# Patient Record
Sex: FEMALE | Race: BLACK OR AFRICAN AMERICAN | NOT HISPANIC OR LATINO
[De-identification: names, ages, dates, MRNs, and addresses within clinical notes are randomized per-mention and may not be internally consistent; named-entity substitution may affect disease eponyms.]

---

## 2017-11-10 ENCOUNTER — APPOINTMENT (OUTPATIENT)
Dept: OBGYN | Facility: CLINIC | Age: 42
End: 2017-11-10
Payer: MEDICAID

## 2017-11-10 VITALS
HEART RATE: 74 BPM | HEIGHT: 68 IN | SYSTOLIC BLOOD PRESSURE: 116 MMHG | DIASTOLIC BLOOD PRESSURE: 73 MMHG | WEIGHT: 146 LBS | BODY MASS INDEX: 22.13 KG/M2

## 2017-11-10 DIAGNOSIS — Z00.00 ENCOUNTER FOR GENERAL ADULT MEDICAL EXAMINATION W/OUT ABNORMAL FINDINGS: ICD-10-CM

## 2017-11-10 DIAGNOSIS — Z78.9 OTHER SPECIFIED HEALTH STATUS: ICD-10-CM

## 2017-11-10 PROCEDURE — 99386 PREV VISIT NEW AGE 40-64: CPT

## 2017-11-13 LAB
BASOPHILS # BLD AUTO: 0.03 K/UL
BASOPHILS NFR BLD AUTO: 0.5 %
C TRACH RRNA SPEC QL NAA+PROBE: NOT DETECTED
EOSINOPHIL # BLD AUTO: 0.14 K/UL
EOSINOPHIL NFR BLD AUTO: 2.3 %
FSH SERPL-MCNC: 8.1 IU/L
HBA1C MFR BLD HPLC: 5 %
HCT VFR BLD CALC: 38.3 %
HGB BLD-MCNC: 12.9 G/DL
HPV HIGH+LOW RISK DNA PNL CVX: NOT DETECTED
IMM GRANULOCYTES NFR BLD AUTO: 0.2 %
LH SERPL-ACNC: 6 IU/L
LYMPHOCYTES # BLD AUTO: 1.84 K/UL
LYMPHOCYTES NFR BLD AUTO: 29.8 %
MAN DIFF?: NORMAL
MCHC RBC-ENTMCNC: 31.8 PG
MCHC RBC-ENTMCNC: 33.7 GM/DL
MCV RBC AUTO: 94.3 FL
MONOCYTES # BLD AUTO: 0.48 K/UL
MONOCYTES NFR BLD AUTO: 7.8 %
N GONORRHOEA RRNA SPEC QL NAA+PROBE: NOT DETECTED
NEUTROPHILS # BLD AUTO: 3.67 K/UL
NEUTROPHILS NFR BLD AUTO: 59.4 %
PLATELET # BLD AUTO: 215 K/UL
PROLACTIN SERPL-MCNC: 13.2 NG/ML
RBC # BLD: 4.06 M/UL
RBC # FLD: 13.6 %
SOURCE TP AMPLIFICATION: NORMAL
T3FREE SERPL-MCNC: 2.82 PG/ML
T4 FREE SERPL-MCNC: 1.1 NG/DL
TESTOST SERPL-MCNC: 19.3 NG/DL
TSH SERPL-ACNC: 1.22 UIU/ML
WBC # FLD AUTO: 6.17 K/UL

## 2017-11-15 LAB — CYTOLOGY CVX/VAG DOC THIN PREP: NORMAL

## 2017-11-20 ENCOUNTER — APPOINTMENT (OUTPATIENT)
Dept: MAMMOGRAPHY | Facility: CLINIC | Age: 42
End: 2017-11-20
Payer: MEDICAID

## 2017-11-20 ENCOUNTER — OUTPATIENT (OUTPATIENT)
Dept: OUTPATIENT SERVICES | Facility: HOSPITAL | Age: 42
LOS: 1 days | End: 2017-11-20
Payer: MEDICAID

## 2017-11-20 DIAGNOSIS — Z00.8 ENCOUNTER FOR OTHER GENERAL EXAMINATION: ICD-10-CM

## 2017-11-20 PROCEDURE — 77063 BREAST TOMOSYNTHESIS BI: CPT

## 2017-11-20 PROCEDURE — 77063 BREAST TOMOSYNTHESIS BI: CPT | Mod: 26

## 2017-11-20 PROCEDURE — G0202: CPT | Mod: 26

## 2017-11-20 PROCEDURE — 77067 SCR MAMMO BI INCL CAD: CPT

## 2017-12-08 ENCOUNTER — APPOINTMENT (OUTPATIENT)
Dept: OBGYN | Facility: CLINIC | Age: 42
End: 2017-12-08
Payer: MEDICAID

## 2017-12-08 VITALS
WEIGHT: 146 LBS | SYSTOLIC BLOOD PRESSURE: 110 MMHG | HEIGHT: 68 IN | BODY MASS INDEX: 22.13 KG/M2 | HEART RATE: 63 BPM | DIASTOLIC BLOOD PRESSURE: 73 MMHG

## 2017-12-08 PROCEDURE — 99212 OFFICE O/P EST SF 10 MIN: CPT

## 2018-01-10 ENCOUNTER — APPOINTMENT (OUTPATIENT)
Dept: INTERVENTIONAL RADIOLOGY/VASCULAR | Facility: CLINIC | Age: 43
End: 2018-01-10
Payer: MEDICAID

## 2018-01-10 ENCOUNTER — OUTPATIENT (OUTPATIENT)
Dept: OUTPATIENT SERVICES | Facility: HOSPITAL | Age: 43
LOS: 1 days | End: 2018-01-10

## 2018-01-10 DIAGNOSIS — N97.9 FEMALE INFERTILITY, UNSPECIFIED: ICD-10-CM

## 2018-01-10 PROCEDURE — 58340 CATHETER FOR HYSTEROGRAPHY: CPT

## 2018-01-10 PROCEDURE — 74740 X-RAY FEMALE GENITAL TRACT: CPT | Mod: 26

## 2018-01-19 ENCOUNTER — APPOINTMENT (OUTPATIENT)
Dept: OBGYN | Facility: CLINIC | Age: 43
End: 2018-01-19
Payer: MEDICAID

## 2018-01-19 VITALS — BODY MASS INDEX: 21.68 KG/M2 | WEIGHT: 143.08 LBS | HEIGHT: 68 IN

## 2018-01-19 VITALS — HEART RATE: 88 BPM | SYSTOLIC BLOOD PRESSURE: 102 MMHG | DIASTOLIC BLOOD PRESSURE: 69 MMHG

## 2018-01-19 DIAGNOSIS — N97.9 FEMALE INFERTILITY, UNSPECIFIED: ICD-10-CM

## 2018-01-19 PROCEDURE — 99213 OFFICE O/P EST LOW 20 MIN: CPT

## 2018-03-14 ENCOUNTER — APPOINTMENT (OUTPATIENT)
Dept: OBGYN | Facility: CLINIC | Age: 43
End: 2018-03-14

## 2024-06-24 DIAGNOSIS — Z01.419 ENCOUNTER FOR GYNECOLOGICAL EXAMINATION (GENERAL) (ROUTINE) W/OUT ABNORMAL FINDINGS: ICD-10-CM

## 2024-07-19 ENCOUNTER — APPOINTMENT (OUTPATIENT)
Dept: FAMILY MEDICINE | Facility: CLINIC | Age: 49
End: 2024-07-19

## 2024-07-19 DIAGNOSIS — Z00.00 ENCOUNTER FOR GENERAL ADULT MEDICAL EXAMINATION W/OUT ABNORMAL FINDINGS: ICD-10-CM

## 2024-07-19 DIAGNOSIS — Z78.9 OTHER SPECIFIED HEALTH STATUS: ICD-10-CM

## 2024-07-30 ENCOUNTER — APPOINTMENT (OUTPATIENT)
Dept: OBGYN | Facility: CLINIC | Age: 49
End: 2024-07-30
Payer: MEDICAID

## 2024-07-30 ENCOUNTER — TRANSCRIPTION ENCOUNTER (OUTPATIENT)
Age: 49
End: 2024-07-30

## 2024-07-30 VITALS
SYSTOLIC BLOOD PRESSURE: 116 MMHG | HEIGHT: 68 IN | DIASTOLIC BLOOD PRESSURE: 74 MMHG | WEIGHT: 148.5 LBS | BODY MASS INDEX: 22.51 KG/M2

## 2024-07-30 DIAGNOSIS — Z78.9 OTHER SPECIFIED HEALTH STATUS: ICD-10-CM

## 2024-07-30 DIAGNOSIS — Z12.39 ENCOUNTER FOR OTHER SCREENING FOR MALIGNANT NEOPLASM OF BREAST: ICD-10-CM

## 2024-07-30 DIAGNOSIS — Z01.419 ENCOUNTER FOR GYNECOLOGICAL EXAMINATION (GENERAL) (ROUTINE) W/OUT ABNORMAL FINDINGS: ICD-10-CM

## 2024-07-30 DIAGNOSIS — Z83.3 FAMILY HISTORY OF DIABETES MELLITUS: ICD-10-CM

## 2024-07-30 DIAGNOSIS — Z86.018 PERSONAL HISTORY OF OTHER BENIGN NEOPLASM: ICD-10-CM

## 2024-07-30 DIAGNOSIS — Z87.42 PERSONAL HISTORY OF OTHER DISEASES OF THE FEMALE GENITAL TRACT: ICD-10-CM

## 2024-07-30 PROCEDURE — 99459 PELVIC EXAMINATION: CPT

## 2024-07-30 PROCEDURE — 99386 PREV VISIT NEW AGE 40-64: CPT

## 2024-07-30 RX ORDER — UBIDECARENONE 200 MG
CAPSULE ORAL
Refills: 0 | Status: ACTIVE | COMMUNITY

## 2024-07-30 RX ORDER — FOLIC ACID 1 MG/1
1 TABLET ORAL
Refills: 0 | Status: ACTIVE | COMMUNITY

## 2024-07-30 NOTE — PHYSICAL EXAM
[Chaperone Present] : A chaperone was present in the examining room during all aspects of the physical examination [24638] : A chaperone was present during the pelvic exam. [Appropriately responsive] : appropriately responsive [Alert] : alert [No Acute Distress] : no acute distress [Soft] : soft [Non-tender] : non-tender [Non-distended] : non-distended [No HSM] : No HSM [No Lesions] : no lesions [No Mass] : no mass [Oriented x3] : oriented x3 [Examination Of The Breasts] : a normal appearance [No Masses] : no breast masses were palpable [Labia Majora] : normal [Labia Minora] : normal [Normal] : normal [Uterine Adnexae] : normal [No Tenderness] : no tenderness [Nl Sphincter Tone] : normal sphincter tone

## 2024-07-30 NOTE — HISTORY OF PRESENT ILLNESS
[N] : Patient does not use contraception [Y] : Positive pregnancy history [Regular Cycle Intervals] : periods have been regular [Frequency: Q ___ days] : menstrual periods occur approximately every [unfilled] days [Menarche Age: ____] : age at menarche was [unfilled] [PGHxTotal] : 2 [[facility] : [facility] [PGHxPremature] : 0 [PGHxAbortions] : 1 [[facility]] : 1 [PGHxABInduced] : 0 [PGHxEctopic] : 0 [PGHxABSpont] : 1 [PGHxMultBirths] : 0

## 2024-07-31 LAB — HPV HIGH+LOW RISK DNA PNL CVX: NOT DETECTED

## 2024-08-05 LAB — CYTOLOGY CVX/VAG DOC THIN PREP: NORMAL

## 2024-08-29 ENCOUNTER — LABORATORY RESULT (OUTPATIENT)
Age: 49
End: 2024-08-29

## 2024-08-29 ENCOUNTER — APPOINTMENT (OUTPATIENT)
Dept: INTERNAL MEDICINE | Facility: CLINIC | Age: 49
End: 2024-08-29
Payer: MEDICAID

## 2024-08-29 VITALS
TEMPERATURE: 97.6 F | OXYGEN SATURATION: 97 % | RESPIRATION RATE: 16 BRPM | SYSTOLIC BLOOD PRESSURE: 107 MMHG | BODY MASS INDEX: 22.91 KG/M2 | DIASTOLIC BLOOD PRESSURE: 71 MMHG | HEIGHT: 67 IN | HEART RATE: 78 BPM | WEIGHT: 146 LBS

## 2024-08-29 DIAGNOSIS — Z00.00 ENCOUNTER FOR GENERAL ADULT MEDICAL EXAMINATION W/OUT ABNORMAL FINDINGS: ICD-10-CM

## 2024-08-29 PROCEDURE — 99386 PREV VISIT NEW AGE 40-64: CPT | Mod: 25

## 2024-08-29 PROCEDURE — G0444 DEPRESSION SCREEN ANNUAL: CPT | Mod: 59

## 2024-08-29 NOTE — HISTORY OF PRESENT ILLNESS
[FreeTextEntry1] : Physical [de-identified] : here to establish care  c/o left side body pain / tingling at times - started after a patient fell on her in 2018  no recent falls or trauma  denies any HA or blurry vision

## 2024-08-29 NOTE — PLAN
[FreeTextEntry1] :   Annual Physical:  Counseled on dietary modification, daily regular exercise.  Increase fiber/ plant-based diet and decrease intake of meats and animal-based diet.  To routinely use seat belts. Self-breast and skin exams advised.  Immunizations discussed and counseled. Age-appropriate screening exams discussed.  Colonoscopy: ref ordered   ? etio to symptoms - advised to consult neuro - might need further w/u incl MRI spine

## 2024-08-29 NOTE — HEALTH RISK ASSESSMENT
[Very Good] : ~his/her~  mood as very good [No] : No [No falls in past year] : Patient reported no falls in the past year [0] : 2) Feeling down, depressed, or hopeless: Not at all (0) [PHQ-2 Negative - No further assessment needed] : PHQ-2 Negative - No further assessment needed [Never] : Never [NO] : No [HIV Test offered] : HIV Test offered [Hepatitis C test offered] : Hepatitis C test offered [None] : None [Alone] : lives alone [] :  [Sexually Active] : sexually active [Feels Safe at Home] : Feels safe at home [Fully functional (bathing, dressing, toileting, transferring, walking, feeding)] : Fully functional (bathing, dressing, toileting, transferring, walking, feeding) [Fully functional (using the telephone, shopping, preparing meals, housekeeping, doing laundry, using] : Fully functional and needs no help or supervision to perform IADLs (using the telephone, shopping, preparing meals, housekeeping, doing laundry, using transportation, managing medications and managing finances) [Smoke Detector] : smoke detector [Carbon Monoxide Detector] : carbon monoxide detector [Seat Belt] :  uses seat belt [Sunscreen] : uses sunscreen [de-identified] : moderate [de-identified] : good [Change in mental status noted] : No change in mental status noted [High Risk Behavior] : no high risk behavior [Reports changes in hearing] : Reports no changes in hearing [Reports changes in vision] : Reports no changes in vision [Reports normal functional visual acuity (ie: able to read med bottle)] : Reports poor functional visual acuity.  [Travel to Developing Areas] : does not  travel to developing areas [MammogramDate] : 08/2024 [PapSmearDate] : 08/2024 [ColonoscopyComments] : never

## 2024-09-04 ENCOUNTER — APPOINTMENT (OUTPATIENT)
Dept: INTERNAL MEDICINE | Facility: CLINIC | Age: 49
End: 2024-09-04

## 2024-09-05 LAB
ALBUMIN SERPL ELPH-MCNC: 4.3 G/DL
ALP BLD-CCNC: 71 U/L
ALT SERPL-CCNC: 14 U/L
ANION GAP SERPL CALC-SCNC: 12 MMOL/L
APPEARANCE: CLEAR
AST SERPL-CCNC: 19 U/L
BASOPHILS # BLD AUTO: 0.05 K/UL
BASOPHILS NFR BLD AUTO: 0.9 %
BILIRUB SERPL-MCNC: 0.4 MG/DL
BILIRUBIN URINE: NEGATIVE
BLOOD URINE: ABNORMAL
BUN SERPL-MCNC: 8 MG/DL
CALCIUM SERPL-MCNC: 10 MG/DL
CHLORIDE SERPL-SCNC: 105 MMOL/L
CHOLEST SERPL-MCNC: 200 MG/DL
CO2 SERPL-SCNC: 24 MMOL/L
COLOR: YELLOW
CREAT SERPL-MCNC: 0.86 MG/DL
EGFR: 84 ML/MIN/1.73M2
EOSINOPHIL # BLD AUTO: 0.04 K/UL
EOSINOPHIL NFR BLD AUTO: 0.7 %
ESTIMATED AVERAGE GLUCOSE: 103 MG/DL
GLUCOSE QUALITATIVE U: NEGATIVE MG/DL
GLUCOSE SERPL-MCNC: 78 MG/DL
HBA1C MFR BLD HPLC: 5.2 %
HCT VFR BLD CALC: 37.8 %
HDLC SERPL-MCNC: 56 MG/DL
HGB BLD-MCNC: 12.5 G/DL
IMM GRANULOCYTES NFR BLD AUTO: 0.2 %
KETONES URINE: 15 MG/DL
LDLC SERPL CALC-MCNC: 135 MG/DL
LEUKOCYTE ESTERASE URINE: NEGATIVE
LYMPHOCYTES # BLD AUTO: 1.54 K/UL
LYMPHOCYTES NFR BLD AUTO: 27 %
MAN DIFF?: NORMAL
MCHC RBC-ENTMCNC: 31.3 PG
MCHC RBC-ENTMCNC: 33.1 GM/DL
MCV RBC AUTO: 94.7 FL
MONOCYTES # BLD AUTO: 0.47 K/UL
MONOCYTES NFR BLD AUTO: 8.2 %
NEUTROPHILS # BLD AUTO: 3.6 K/UL
NEUTROPHILS NFR BLD AUTO: 63 %
NITRITE URINE: NEGATIVE
NONHDLC SERPL-MCNC: 143 MG/DL
PH URINE: 5
PLATELET # BLD AUTO: 239 K/UL
POTASSIUM SERPL-SCNC: 4.3 MMOL/L
PROT SERPL-MCNC: 7 G/DL
PROTEIN URINE: 30 MG/DL
RBC # BLD: 3.99 M/UL
RBC # FLD: 13.2 %
SODIUM SERPL-SCNC: 141 MMOL/L
SPECIFIC GRAVITY URINE: >1.03
TRIGL SERPL-MCNC: 43 MG/DL
TSH SERPL-ACNC: 0.87 UIU/ML
URATE SERPL-MCNC: 4.3 MG/DL
UROBILINOGEN URINE: 1 MG/DL
WBC # FLD AUTO: 5.71 K/UL

## 2024-09-06 LAB
HAV IGM SER QL: NONREACTIVE
HBV CORE IGM SER QL: NONREACTIVE
HBV SURFACE AG SER QL: NONREACTIVE
HCV AB SER QL: NONREACTIVE
HCV S/CO RATIO: 0.11 S/CO

## 2024-09-12 ENCOUNTER — NON-APPOINTMENT (OUTPATIENT)
Age: 49
End: 2024-09-12

## 2024-09-26 ENCOUNTER — APPOINTMENT (OUTPATIENT)
Dept: INTERNAL MEDICINE | Facility: CLINIC | Age: 49
End: 2024-09-26

## 2024-09-26 PROCEDURE — ZZZZZ: CPT

## 2024-09-30 LAB
APPEARANCE: CLEAR
BILIRUBIN URINE: NEGATIVE
BLOOD URINE: NEGATIVE
COLOR: YELLOW
GLUCOSE QUALITATIVE U: NEGATIVE MG/DL
KETONES URINE: NEGATIVE MG/DL
LEUKOCYTE ESTERASE URINE: NEGATIVE
NITRITE URINE: NEGATIVE
PH URINE: 5.5
PROTEIN URINE: NEGATIVE MG/DL
SPECIFIC GRAVITY URINE: 1.01
UROBILINOGEN URINE: 0.2 MG/DL

## 2024-11-22 ENCOUNTER — APPOINTMENT (OUTPATIENT)
Dept: GASTROENTEROLOGY | Facility: CLINIC | Age: 49
End: 2024-11-22

## 2024-11-22 ENCOUNTER — APPOINTMENT (OUTPATIENT)
Dept: GASTROENTEROLOGY | Facility: CLINIC | Age: 49
End: 2024-11-22
Payer: MEDICAID

## 2024-11-22 VITALS
WEIGHT: 155 LBS | DIASTOLIC BLOOD PRESSURE: 85 MMHG | HEIGHT: 67 IN | TEMPERATURE: 97.6 F | SYSTOLIC BLOOD PRESSURE: 130 MMHG | HEART RATE: 76 BPM | BODY MASS INDEX: 24.33 KG/M2 | OXYGEN SATURATION: 98 %

## 2024-11-22 DIAGNOSIS — K59.01 SLOW TRANSIT CONSTIPATION: ICD-10-CM

## 2024-11-22 DIAGNOSIS — Z12.11 ENCOUNTER FOR SCREENING FOR MALIGNANT NEOPLASM OF COLON: ICD-10-CM

## 2024-11-22 DIAGNOSIS — K59.09 OTHER CONSTIPATION: ICD-10-CM

## 2024-11-22 PROCEDURE — 99204 OFFICE O/P NEW MOD 45 MIN: CPT

## 2024-11-22 RX ORDER — POLYETHYLENE GLYCOL 3350 17 G/17G
17 POWDER, FOR SOLUTION ORAL
Qty: 238 | Refills: 0 | Status: ACTIVE | COMMUNITY
Start: 2024-11-22 | End: 1900-01-01

## 2025-01-17 ENCOUNTER — TRANSCRIPTION ENCOUNTER (OUTPATIENT)
Age: 50
End: 2025-01-17

## 2025-01-17 ENCOUNTER — OUTPATIENT (OUTPATIENT)
Dept: OUTPATIENT SERVICES | Facility: HOSPITAL | Age: 50
LOS: 1 days | End: 2025-01-17
Payer: COMMERCIAL

## 2025-01-17 ENCOUNTER — APPOINTMENT (OUTPATIENT)
Dept: GASTROENTEROLOGY | Facility: GI CENTER | Age: 50
End: 2025-01-17
Payer: MEDICAID

## 2025-01-17 DIAGNOSIS — Z12.11 ENCOUNTER FOR SCREENING FOR MALIGNANT NEOPLASM OF COLON: ICD-10-CM

## 2025-01-17 PROCEDURE — G0121: CPT | Mod: 74

## 2025-01-17 PROCEDURE — 45378 DIAGNOSTIC COLONOSCOPY: CPT | Mod: 53,33

## 2025-03-20 ENCOUNTER — EMERGENCY (EMERGENCY)
Facility: HOSPITAL | Age: 50
LOS: 1 days | Discharge: HOME | End: 2025-03-20
Attending: EMERGENCY MEDICINE
Payer: COMMERCIAL

## 2025-03-20 VITALS
TEMPERATURE: 98 F | RESPIRATION RATE: 18 BRPM | DIASTOLIC BLOOD PRESSURE: 69 MMHG | HEART RATE: 95 BPM | OXYGEN SATURATION: 98 % | SYSTOLIC BLOOD PRESSURE: 124 MMHG

## 2025-03-20 PROCEDURE — 99284 EMERGENCY DEPT VISIT MOD MDM: CPT | Mod: 25

## 2025-03-20 PROCEDURE — 99283 EMERGENCY DEPT VISIT LOW MDM: CPT

## 2025-03-20 RX ORDER — DIPHENHYDRAMINE HCL 12.5MG/5ML
1 ELIXIR ORAL
Qty: 18 | Refills: 0
Start: 2025-03-20 | End: 2025-03-22

## 2025-03-20 RX ORDER — PREDNISONE 20 MG/1
1 TABLET ORAL
Qty: 1 | Refills: 0
Start: 2025-03-20 | End: 2025-03-20

## 2025-03-20 RX ORDER — DIPHENHYDRAMINE HCL 12.5MG/5ML
50 ELIXIR ORAL ONCE
Refills: 0 | Status: COMPLETED | OUTPATIENT
Start: 2025-03-20 | End: 2025-03-20

## 2025-03-20 RX ORDER — PREDNISONE 20 MG/1
40 TABLET ORAL ONCE
Refills: 0 | Status: ACTIVE | OUTPATIENT
Start: 2025-03-20 | End: 2025-03-20

## 2025-03-20 RX ADMIN — Medication 20 MILLIGRAM(S): at 05:35

## 2025-03-20 RX ADMIN — Medication 50 MILLIGRAM(S): at 05:35

## 2025-03-20 NOTE — ED PROVIDER NOTE - NSFOLLOWUPCLINICS_GEN_ALL_ED_FT
[facility]  Allergy  [address] [place] [address]  Phone: [phone]  Fax:

## 2025-03-20 NOTE — ED PROVIDER NOTE - WORK/EXCUSE FORM DATE
24-Mar-2025
PAST SURGICAL HISTORY:  History of left oophorectomy     S/P laparoscopic surgery laparoscopy with left ovarian mass biopsies    Urethral disorder urethral dilation  at 24 yr old for cystitis  chronic  UTI "S   no problem since then

## 2025-03-20 NOTE — ED PROVIDER NOTE - PATIENT PORTAL LINK FT
You can access the FollowMyHealth Patient Portal offered by [facility] by registering at the following website: [email or web address]. By joining [employer]’s FollowMyHealth portal, you will also be able to view your health information using other applications (apps) compatible with our system.

## 2025-03-20 NOTE — ED PROVIDER NOTE - OBJECTIVE STATEMENT
50yo female with no pmhx presents to ED c/o pruritic rash x 3 days. Pt states her rash began monday on her arms and since then has spread to her whole body. Pt states she was taking clindamycin for an infected cut on her R shin and finished the course on tuesday. Pt states she has not used any other new medications, denies new detergents, fragrances. Pt denies any difficulty breathing, difficulty swallowing, throat swelling, fever. Pt speaking in full sentences, tolerating secretions. Pt denies taking any meds at home for ssx. Pt denies any other complaints at this time.

## 2025-03-20 NOTE — ED PROVIDER NOTE - PHYSICAL EXAMINATION
Gen: No acute distress, non toxic  HENT: NCAT, Mucous membranes moist, Oropharynx without exudates, uvula midline  Eyes: pink conjunctivae, EOMI, PERRL  CV: RRR, nl s1/s2.  Resp: CTAB, normal rate and effort  GI: Abdomen soft, NT, ND. No rebound, no guarding  Neuro: A&O x 3  MSK: No spine or joint tenderness to palpation, Full ROM ext x 4  Skin: +pruritic hives to bilateral arms, trunk

## 2025-03-20 NOTE — ED ADULT TRIAGE NOTE - CHIEF COMPLAINT QUOTE
pt c/o itchy all over with hives started on monday at 2000 pt states she recently stopped taking clindamycin on monday

## 2025-03-20 NOTE — ED PROVIDER NOTE - CLINICAL SUMMARY MEDICAL DECISION MAKING FREE TEXT BOX
48yo female with no pmhx presents to ED c/o pruritic rash x 3 days. Pt states her rash began monday on her arms and since then has spread to her whole body. Pt states she was taking clindamycin for an infected cut on her R shin and finished the course on tuesday. Pt states she has not used any other new medications, denies new detergents, fragrances. Pt denies any difficulty breathing, difficulty swallowing, throat swelling, fever. Pt speaking in full sentences, tolerating secretions. Pt denies taking any meds at home for ssx. Pt denies any other complaints at this time. Rash consistent with allergic reaction. Pt improved with meds in ED, sending meds to pharmacy and giving allergist follow up. Pt to be discharged. Pt agrees and understands with plan for discharge. Return precautions discussed with patient. 48yo female with no pmhx presents to ED c/o pruritic rash x 3 days. Pt states her rash began monday on her arms and since then has spread to her whole body. Pt states she was taking clindamycin for an infected cut on her R shin and finished the course on tuesday. Pt states she has not used any other new medications, denies new detergents, fragrances. Pt denies any difficulty breathing, difficulty swallowing, throat swelling, fever. Pt speaking in full sentences, tolerating secretions. Pt denies taking any meds at home for ssx. Pt denies any other complaints at this time. Rash consistent with allergic reaction. Pt improved with meds in ED, no longer itching. sending meds to pharmacy and giving allergist follow up. Pt to be discharged. Pt agrees and understands with plan for discharge. Return precautions discussed with patient. 50yo female with no pmhx presents to ED c/o pruritic rash x 3 days. Pt states her rash began monday on her arms and since then has spread to her whole body. Pt states she was taking clindamycin for an infected cut on her R shin and finished the course on tuesday. Pt states she has not used any other new medications, denies new detergents, fragrances. Pt denies any difficulty breathing, difficulty swallowing, throat swelling, fever. Pt speaking in full sentences, tolerating secretions. Pt denies taking any meds at home for ssx. Pt denies any other complaints at this time. Rash consistent with urticaria. Pt improved with meds in ED, no longer itching. sending meds to pharmacy and giving allergist follow up. Pt to be discharged. Pt agrees and understands with plan for discharge. Return precautions discussed with patient.

## 2025-03-20 NOTE — ED PROVIDER NOTE - ATTENDING APP SHARED VISIT CONTRIBUTION OF CARE
48yo female with no pmhx presents to ED c/o pruritic rash x 3 days. Pt states her rash began monday on her arms and since then has spread to her whole body. Pt states she was taking clindamycin for an infected cut on her R shin and finished the course on tuesday. Pt states she has not used any other new medications, denies new detergents, fragrances. Pt denies any difficulty breathing, difficulty swallowing, throat swelling, fever. Pt speaking in full sentences, tolerating secretions. Pt denies taking any meds at home for ssx. Pt denies any other complaints at this time. Rash consistent with urticaria. Pt improved with meds in ED, no longer itching. sending meds to pharmacy and giving allergist follow up. Pt to be discharged. Pt agrees and understands with plan for discharge. Return precautions discussed with patient.

## 2025-03-20 NOTE — ED PROVIDER NOTE - NS ED ROS FT
Pt's mother Yumiko Montgomery was contacted about dexa scan. Yumiko Montgomery states no one contacted pt or herself. [facility]) Scheduling number given to Yumiko Montgomery.   Yumiko Montgomery states she will schedule dexa scan and contact our office with date and time of test.
Gen: denies fever, chills, fatigue, weight loss  Skin: +rash  HEENT: denies visual changes, ear pain, nasal congestion, throat pain  Respiratory: denies STEEN, SOB, cough, wheezing  Cardiovascular: denies chest pain, palpitations, diaphoresis, LE edema  GI: denies abdominal pain, n/v/d  : denies dysuria, frequency, urgency, bowel/bladder incontinence  MSK: denies joint swelling/pain, back pain, neck pain  Neuro: denies headache, dizziness, weakness, numbness

## 2025-03-20 NOTE — ED PROVIDER NOTE - NSFOLLOWUPINSTRUCTIONS_ED_ALL_ED_FT
Please follow up with allergist within 3 days.    Please follow up with PCP wtihin 3 days.    Allergic Reaction    An allergic reaction is an abnormal reaction to a substance (allergen) by the body's defense system. Common allergens include medicines, food, insect bites or stings, and blood products. The body releases certain proteins into the blood that can cause a variety of symptoms such as an itchy rash, wheezing, swelling of the face/lips/tongue/throat, abdominal pain, nausea or vomiting. An allergic reaction is usually treated with medication. If your health care provider prescribed you an epinephrine injection device, make sure to keep it with you at all times.    SEEK IMMEDIATE MEDICAL CARE IF YOU HAVE ANY OF THE FOLLOWING SYMPTOMS: allergic reaction severe enough that required you to use epinephrine, tightness in your chest, swelling around your lips/tongue/throat, abdominal pain, vomiting or diarrhea, or lightheadedness/dizziness. These symptoms may represent a serious problem that is an emergency. Do not wait to see if the symptoms will go away. Use your auto-injector pen or anaphylaxis kit as you have been instructed. Call 911 and do not drive yourself to the hospital.

## 2025-05-08 ENCOUNTER — OFFICE (OUTPATIENT)
Dept: URBAN - METROPOLITAN AREA CLINIC 115 | Facility: CLINIC | Age: 50
Setting detail: OPHTHALMOLOGY
End: 2025-05-08
Payer: COMMERCIAL

## 2025-05-08 DIAGNOSIS — H40.013: ICD-10-CM

## 2025-05-08 PROBLEM — H52.7 REFRACTIVE ERROR: Status: ACTIVE | Noted: 2025-05-08

## 2025-05-08 PROCEDURE — 92002 INTRM OPH EXAM NEW PATIENT: CPT | Performed by: OPTOMETRIST

## 2025-05-08 PROCEDURE — 92250 FUNDUS PHOTOGRAPHY W/I&R: CPT | Performed by: OPTOMETRIST

## 2025-05-08 ASSESSMENT — REFRACTION_AUTOREFRACTION
OS_SPHERE: +0.50
OS_AXIS: 074
OD_AXIS: 048
OD_CYLINDER: -0.50
OS_CYLINDER: -0.25
OD_SPHERE: +0.25

## 2025-05-08 ASSESSMENT — REFRACTION_CURRENTRX
OD_OVR_VA: 20/
OS_SPHERE: +1.75
OD_AXIS: 180
OD_CYLINDER: 0.00
OS_CYLINDER: 0.00
OD_SPHERE: +1.50
OD_VPRISM_DIRECTION: SV
OS_OVR_VA: 20/
OS_AXIS: 180
OS_VPRISM_DIRECTION: SV

## 2025-05-08 ASSESSMENT — CONFRONTATIONAL VISUAL FIELD TEST (CVF)
OS_FINDINGS: FULL
OD_FINDINGS: FULL

## 2025-05-08 ASSESSMENT — VISUAL ACUITY
OS_BCVA: 20/20-1
OD_BCVA: 20/20-1

## 2025-05-08 ASSESSMENT — TONOMETRY: OS_IOP_MMHG: 21

## 2025-08-29 ENCOUNTER — LABORATORY RESULT (OUTPATIENT)
Age: 50
End: 2025-08-29

## 2025-08-29 ENCOUNTER — NON-APPOINTMENT (OUTPATIENT)
Age: 50
End: 2025-08-29

## 2025-08-29 ENCOUNTER — APPOINTMENT (OUTPATIENT)
Dept: INTERNAL MEDICINE | Facility: CLINIC | Age: 50
End: 2025-08-29

## 2025-08-29 VITALS
HEART RATE: 70 BPM | WEIGHT: 157 LBS | RESPIRATION RATE: 16 BRPM | HEIGHT: 67 IN | SYSTOLIC BLOOD PRESSURE: 100 MMHG | TEMPERATURE: 97.3 F | DIASTOLIC BLOOD PRESSURE: 70 MMHG | BODY MASS INDEX: 24.64 KG/M2 | OXYGEN SATURATION: 99 %

## 2025-08-29 DIAGNOSIS — R20.2 PARESTHESIA OF SKIN: ICD-10-CM

## 2025-08-29 DIAGNOSIS — Z00.00 ENCOUNTER FOR GENERAL ADULT MEDICAL EXAMINATION W/OUT ABNORMAL FINDINGS: ICD-10-CM

## 2025-08-29 PROCEDURE — 93000 ELECTROCARDIOGRAM COMPLETE: CPT

## 2025-08-29 PROCEDURE — 99396 PREV VISIT EST AGE 40-64: CPT | Mod: 25

## 2025-09-02 LAB
APPEARANCE: CLEAR
BASOPHILS # BLD AUTO: 0.05 K/UL
BASOPHILS NFR BLD AUTO: 0.6 %
BILIRUBIN URINE: NEGATIVE
BLOOD URINE: ABNORMAL
CHOLEST SERPL-MCNC: 200 MG/DL
COLOR: YELLOW
EOSINOPHIL # BLD AUTO: 0.06 K/UL
EOSINOPHIL NFR BLD AUTO: 0.7 %
ESTIMATED AVERAGE GLUCOSE: 105 MG/DL
GLUCOSE QUALITATIVE U: NEGATIVE MG/DL
HBA1C MFR BLD HPLC: 5.3 %
HCT VFR BLD CALC: 39.8 %
HDLC SERPL-MCNC: 61 MG/DL
HGB BLD-MCNC: 13.1 G/DL
IMM GRANULOCYTES NFR BLD AUTO: 0.2 %
KETONES URINE: NEGATIVE MG/DL
LDLC SERPL-MCNC: 131 MG/DL
LEUKOCYTE ESTERASE URINE: ABNORMAL
LYMPHOCYTES # BLD AUTO: 1.54 K/UL
LYMPHOCYTES NFR BLD AUTO: 18.5 %
MAN DIFF?: NORMAL
MCHC RBC-ENTMCNC: 31.1 PG
MCHC RBC-ENTMCNC: 32.9 G/DL
MCV RBC AUTO: 94.5 FL
MONOCYTES # BLD AUTO: 0.54 K/UL
MONOCYTES NFR BLD AUTO: 6.5 %
NEUTROPHILS # BLD AUTO: 6.1 K/UL
NEUTROPHILS NFR BLD AUTO: 73.5 %
NITRITE URINE: POSITIVE
NONHDLC SERPL-MCNC: 140 MG/DL
PH URINE: 5.5
PLATELET # BLD AUTO: 231 K/UL
PROTEIN URINE: NORMAL MG/DL
RBC # BLD: 4.21 M/UL
RBC # FLD: 13 %
SPECIFIC GRAVITY URINE: 1.02
TRIGL SERPL-MCNC: 46 MG/DL
URATE SERPL-MCNC: 3.3 MG/DL
UROBILINOGEN URINE: 0.2 MG/DL
WBC # FLD AUTO: 8.31 K/UL

## 2025-09-05 ENCOUNTER — NON-APPOINTMENT (OUTPATIENT)
Age: 50
End: 2025-09-05

## 2025-09-05 LAB
A PHAGOCYTOPH IGG TITR SER IF: NORMAL
B BURGDOR AB SER QL IA: 0.83 IV
B MICROTI IGG TITR SER: NORMAL
E CHAFFEENSIS IGG TITR SER IF: NORMAL

## (undated) DEVICE — KIT DEFENDO 4 OLY 4 PC